# Patient Record
Sex: MALE | Race: WHITE | URBAN - METROPOLITAN AREA
[De-identification: names, ages, dates, MRNs, and addresses within clinical notes are randomized per-mention and may not be internally consistent; named-entity substitution may affect disease eponyms.]

---

## 2020-06-25 ENCOUNTER — OFFICE VISIT (OUTPATIENT)
Dept: URBAN - METROPOLITAN AREA CLINIC 113 | Facility: CLINIC | Age: 65
End: 2020-06-25

## 2020-07-10 ENCOUNTER — OFFICE VISIT (OUTPATIENT)
Dept: URBAN - METROPOLITAN AREA SURGERY CENTER 25 | Facility: SURGERY CENTER | Age: 65
End: 2020-07-10

## 2020-07-25 ENCOUNTER — TELEPHONE ENCOUNTER (OUTPATIENT)
Dept: URBAN - METROPOLITAN AREA CLINIC 13 | Facility: CLINIC | Age: 65
End: 2020-07-25

## 2020-07-25 RX ORDER — ASPIRIN 81 MG/1
TAKE 1 TABLET DAILY TABLET, DELAYED RELEASE ORAL
Refills: 0 | OUTPATIENT
End: 2018-10-10

## 2020-07-25 RX ORDER — SUCRALFATE 1 G/1
DISSOLVE 1 TABLET IN 10 ML OF WATER TO CREATE SLURRY, THEN DRINK THIS SLURRY FOUR TIMES DAILY TABLET ORAL
Qty: 120 | Refills: 5 | OUTPATIENT
Start: 2019-06-14 | End: 2020-02-20

## 2020-07-25 RX ORDER — SODIUM SULFATE, POTASSIUM SULFATE, MAGNESIUM SULFATE 17.5; 3.13; 1.6 G/ML; G/ML; G/ML
DILUTE CONTENTS AND USE AS DIRECTED FOR BOWEL PREP SOLUTION, CONCENTRATE ORAL
Qty: 1 | Refills: 0 | OUTPATIENT
Start: 2019-06-14 | End: 2019-09-30

## 2020-07-25 RX ORDER — AMITRIPTYLINE HYDROCHLORIDE 10 MG/1
TAKE 1 TAB PO QHS FOR 2 WEEKS, THEN INCREASE TO 2 TABS PO QHS TABLET, FILM COATED ORAL
Qty: 45 | Refills: 1 | OUTPATIENT
Start: 2019-09-30 | End: 2020-02-20

## 2020-07-25 RX ORDER — PANTOPRAZOLE SODIUM 40 MG/1
TAKE 1 TABLET DAILY TABLET, DELAYED RELEASE ORAL
Qty: 90 | Refills: 3 | OUTPATIENT
Start: 2019-06-03 | End: 2019-06-03

## 2020-07-25 RX ORDER — NITROGLYCERIN 0.4 MG/1
PLACE ONE TABLET UNDER THE TONGUE AT ONSET OF CHEST PAIN. MAY REPEAT E TABLET SUBLINGUAL
Qty: 25 | Refills: 0 | OUTPATIENT
Start: 2019-04-25 | End: 2020-04-03

## 2020-07-25 RX ORDER — PHENOBARBITAL, HYOSCYAMINE SULFATE, ATROPINE SULFATE, SCOPOLAMINE HYDROBROMIDE 16.2; .1037; .0194; .0065 MG/5ML; MG/5ML; MG/5ML; MG/5ML
ELIXIR ORAL
Refills: 0 | OUTPATIENT
End: 2020-04-03

## 2020-07-25 RX ORDER — CARVEDILOL 3.12 MG/1
TAKE 1 TABLET TWICE DAILY WITH MEALS TABLET, FILM COATED ORAL
Refills: 0 | OUTPATIENT
End: 2020-02-20

## 2020-07-25 RX ORDER — PRAVASTATIN SODIUM 10 MG/1
TAKE 1 TABLET DAILY AS DIRECTED TABLET ORAL
Refills: 0 | OUTPATIENT
End: 2019-06-03

## 2020-07-25 RX ORDER — BUSPIRONE HYDROCHLORIDE 5 MG/1
TAKE 1 TABLET BY MOUTH TWICE DAILY TABLET ORAL
Qty: 60 | Refills: 2 | OUTPATIENT
Start: 2020-02-20 | End: 2020-04-03

## 2020-07-25 RX ORDER — POLYETHYLENE GLYCOL 3350, SODIUM SULFATE, SODIUM CHLORIDE, POTASSIUM CHLORIDE, ASCORBIC ACID, SODIUM ASCORBATE 7.5-2.691G
USE AS DIRECTED KIT ORAL
Qty: 1 | Refills: 0 | OUTPATIENT
Start: 2014-03-21 | End: 2014-04-09

## 2020-07-26 ENCOUNTER — TELEPHONE ENCOUNTER (OUTPATIENT)
Dept: URBAN - METROPOLITAN AREA CLINIC 13 | Facility: CLINIC | Age: 65
End: 2020-07-26

## 2020-07-26 RX ORDER — AMOXICILLIN 875 MG/1
TAKE ONE TABLET BY MOUTH TWICE A DAY TABLET, FILM COATED ORAL
Qty: 20 | Refills: 0 | Status: ACTIVE | COMMUNITY
Start: 2018-04-06

## 2020-07-26 RX ORDER — NEOMYCIN AND POLYMYXIN B SULFATES AND DEXAMETHASONE 1; 3.5; 1 MG/G; MG/G; [IU]/G
APPLY A SMALL AMOUNT INTO THE CONJUNCTIVAL SAC(S) IN AFFECTED EYE(S) T OINTMENT OPHTHALMIC
Qty: 4 | Refills: 0 | Status: ACTIVE | COMMUNITY
Start: 2018-07-25

## 2020-07-26 RX ORDER — AMOXICILLIN 500 MG/1
CAPSULE ORAL
Qty: 30 | Refills: 0 | Status: ACTIVE | COMMUNITY
Start: 2018-10-30

## 2020-07-26 RX ORDER — IBUPROFEN 400 MG/1
TABLET, FILM COATED ORAL
Qty: 15 | Refills: 0 | Status: ACTIVE | COMMUNITY
Start: 2019-01-05

## 2020-07-26 RX ORDER — TICAGRELOR 90 MG/1
TABLET ORAL
Qty: 60 | Refills: 0 | Status: ACTIVE | COMMUNITY
Start: 2018-12-08

## 2020-07-26 RX ORDER — PRAVASTATIN SODIUM 40 MG/1
TAKE ONE TABLET BY MOUTH NIGHTLY TABLET ORAL
Qty: 30 | Refills: 0 | Status: ACTIVE | COMMUNITY
Start: 2018-03-13

## 2020-07-26 RX ORDER — TOBRAMYCIN AND DEXAMETHASONE 3; 1 MG/ML; MG/ML
INSTILL ONE DROP INTO THE RIGHT EYE THREE TIMES DAILY FOR 7 DAYS, THEN SUSPENSION/ DROPS OPHTHALMIC
Qty: 10 | Refills: 0 | Status: ACTIVE | COMMUNITY
Start: 2018-07-23

## 2020-07-26 RX ORDER — PANTOPRAZOLE SODIUM 40 MG/1
TAKE ONE TABLET TWICE A DAY TABLET, DELAYED RELEASE ORAL
Qty: 60 | Refills: 5 | Status: ACTIVE | COMMUNITY
Start: 2020-06-02

## 2020-07-26 RX ORDER — ATORVASTATIN CALCIUM 40 MG/1
TABLET, FILM COATED ORAL
Qty: 90 | Refills: 0 | Status: ACTIVE | COMMUNITY
Start: 2014-01-19

## 2020-07-26 RX ORDER — CLINDAMYCIN HYDROCHLORIDE 150 MG/1
CAPSULE ORAL
Qty: 56 | Refills: 0 | Status: ACTIVE | COMMUNITY
Start: 2018-06-08

## 2020-07-26 RX ORDER — EVOLOCUMAB 140 MG/ML
INJECTION, SOLUTION SUBCUTANEOUS
Qty: 2 | Refills: 0 | Status: ACTIVE | COMMUNITY
Start: 2018-12-10

## 2020-07-26 RX ORDER — ALIROCUMAB 75 MG/ML
INJECT 1 ML EVERY 2 WEEKS INJECTION, SOLUTION SUBCUTANEOUS
Refills: 0 | Status: ACTIVE | COMMUNITY
Start: 2020-01-30

## 2020-07-26 RX ORDER — DIAZEPAM 10 MG/1
TABLET ORAL
Qty: 1 | Refills: 0 | Status: ACTIVE | COMMUNITY
Start: 2019-02-28

## 2020-07-26 RX ORDER — FENOFIBRATE 48 MG/1
TABLET ORAL
Qty: 90 | Refills: 0 | Status: ACTIVE | COMMUNITY
Start: 2018-06-28

## 2020-07-26 RX ORDER — POLYETHYLENE GLYCOL 3350, SODIUM CHLORIDE, SODIUM BICARBONATE AND POTASSIUM CHLORIDE WITH LEMON FLAVOR 420; 11.2; 5.72; 1.48 G/4L; G/4L; G/4L; G/4L
TAKE 1/2 GALLON AT 5:00 PM DAY BEFORE PROCEDURE, TAKE SECOND 1/2 OF GALLON 6 HRS PRIOR TO PROCEDURE POWDER, FOR SOLUTION ORAL
Qty: 1 | Refills: 0 | Status: ACTIVE | COMMUNITY
Start: 2020-07-24

## 2020-07-26 RX ORDER — AZITHROMYCIN DIHYDRATE 250 MG/1
TABLET, FILM COATED ORAL
Qty: 6 | Refills: 0 | Status: ACTIVE | COMMUNITY
Start: 2014-02-17

## 2020-07-26 RX ORDER — FLUOROURACIL 50 MG/G
APPLY TO AFFECTED AREA(S) OF THE NOSE EVERY NIGHT AFTER WASHING FOR TW CREAM TOPICAL
Qty: 40 | Refills: 0 | Status: ACTIVE | COMMUNITY
Start: 2019-10-01

## 2020-07-26 RX ORDER — PRAVASTATIN SODIUM 20 MG/1
TAKE 1 TABLET DAILY AS DIRECTED TABLET ORAL
Refills: 0 | Status: ACTIVE | COMMUNITY

## 2020-07-26 RX ORDER — CIPROFLOXACIN 3 MG/ML
SOLUTION OPHTHALMIC
Qty: 5 | Refills: 0 | Status: ACTIVE | COMMUNITY
Start: 2018-07-21

## 2020-07-26 RX ORDER — AZELASTINE HYDROCHLORIDE 137 UG/1
SPRAY, METERED NASAL
Qty: 30 | Refills: 0 | Status: ACTIVE | COMMUNITY
Start: 2018-04-04

## 2020-07-26 RX ORDER — BENZONATATE 100 MG/1
CAPSULE ORAL
Qty: 30 | Refills: 0 | Status: ACTIVE | COMMUNITY
Start: 2014-02-17

## 2020-07-26 RX ORDER — ETODOLAC 400 MG/1
TAKE ONE TABLET BY MOUTH THREE TIMES A DAY AS NEEDED FOR PAIN TABLET, COATED ORAL
Qty: 12 | Refills: 0 | Status: ACTIVE | COMMUNITY
Start: 2018-10-30

## 2020-07-26 RX ORDER — TICAGRELOR 60 MG/1
TAKE 1 TABLET BY MOUTH TWICE A DAY. DOSE UNKNOWN PER PT TABLET ORAL
Refills: 0 | Status: ACTIVE | COMMUNITY

## 2020-07-26 RX ORDER — FAMOTIDINE 40 MG/1
TAKE 1 TABLET BEDTIME TABLET ORAL
Qty: 30 | Refills: 3 | Status: ACTIVE | COMMUNITY
Start: 2020-07-29

## 2020-07-26 RX ORDER — SILDENAFIL CITRATE 100 MG/1
TAKE ONE TABLET BY MOUTH ONCE A DAY AS NEEDED, APPROXIMATELY 1 HOUR BE TABLET, FILM COATED ORAL
Qty: 6 | Refills: 0 | Status: ACTIVE | COMMUNITY
Start: 2017-12-11

## 2020-07-26 RX ORDER — METHYLPREDNISOLONE 4 MG/1
TABLET ORAL
Qty: 21 | Refills: 0 | Status: ACTIVE | COMMUNITY
Start: 2018-04-04

## 2020-07-26 RX ORDER — VALACYCLOVIR 1 G/1
TABLET, FILM COATED ORAL
Qty: 21 | Refills: 0 | Status: ACTIVE | COMMUNITY
Start: 2018-07-25

## 2020-07-26 RX ORDER — NITROGLYCERIN 0.4 MG/1
TABLET SUBLINGUAL
Qty: 25 | Refills: 0 | Status: ACTIVE | COMMUNITY
Start: 2018-12-08

## 2020-07-26 RX ORDER — FLUOROURACIL 50 MG/G
CREAM TOPICAL
Qty: 40 | Refills: 0 | Status: ACTIVE | COMMUNITY
Start: 2018-09-20

## 2020-07-29 ENCOUNTER — OFFICE VISIT (OUTPATIENT)
Dept: URBAN - METROPOLITAN AREA SURGERY CENTER 25 | Facility: SURGERY CENTER | Age: 65
End: 2020-07-29

## 2020-07-29 ENCOUNTER — CLAIMS CREATED FROM THE CLAIM WINDOW (OUTPATIENT)
Dept: URBAN - METROPOLITAN AREA CLINIC 4 | Facility: CLINIC | Age: 65
End: 2020-07-29
Payer: MEDICARE

## 2020-07-29 DIAGNOSIS — K21.0 GASTRO-ESOPHAGEAL REFLUX DISEASE WITH ESOPHAGITIS: ICD-10-CM

## 2020-07-29 DIAGNOSIS — K29.60 OTHER GASTRITIS WITHOUT BLEEDING: ICD-10-CM

## 2020-07-29 PROCEDURE — 88312 SPECIAL STAINS GROUP 1: CPT | Performed by: PATHOLOGY

## 2020-07-29 PROCEDURE — 88313 SPECIAL STAINS GROUP 2: CPT | Performed by: PATHOLOGY

## 2020-07-29 PROCEDURE — 88305 TISSUE EXAM BY PATHOLOGIST: CPT | Performed by: PATHOLOGY

## 2020-08-12 ENCOUNTER — OFFICE VISIT (OUTPATIENT)
Dept: URBAN - METROPOLITAN AREA CLINIC 113 | Facility: CLINIC | Age: 65
End: 2020-08-12

## 2020-08-24 ENCOUNTER — OFFICE VISIT (OUTPATIENT)
Dept: URBAN - METROPOLITAN AREA CLINIC 113 | Facility: CLINIC | Age: 65
End: 2020-08-24

## 2020-08-25 ENCOUNTER — OFFICE VISIT (OUTPATIENT)
Dept: URBAN - METROPOLITAN AREA SURGERY CENTER 25 | Facility: SURGERY CENTER | Age: 65
End: 2020-08-25
Payer: MEDICARE

## 2020-08-25 ENCOUNTER — CLAIMS CREATED FROM THE CLAIM WINDOW (OUTPATIENT)
Dept: URBAN - METROPOLITAN AREA CLINIC 4 | Facility: CLINIC | Age: 65
End: 2020-08-25
Payer: MEDICARE

## 2020-08-25 DIAGNOSIS — D12.3 ADENOMA OF TRANSVERSE COLON: ICD-10-CM

## 2020-08-25 DIAGNOSIS — Z86.010 HISTORY OF COLONIC POLYPS: ICD-10-CM

## 2020-08-25 DIAGNOSIS — D12.3 BENIGN NEOPLASM OF TRANSVERSE COLON: ICD-10-CM

## 2020-08-25 DIAGNOSIS — K57.30 COLON, DIVERTICULOSIS: ICD-10-CM

## 2020-08-25 DIAGNOSIS — K64.0 FIRST DEGREE HEMORRHOIDS: ICD-10-CM

## 2020-08-25 PROCEDURE — 45385 COLONOSCOPY W/LESION REMOVAL: CPT | Performed by: INTERNAL MEDICINE

## 2020-08-25 PROCEDURE — 88305 TISSUE EXAM BY PATHOLOGIST: CPT | Performed by: PATHOLOGY

## 2020-08-25 RX ORDER — METHYLPREDNISOLONE 4 MG/1
TABLET ORAL
Qty: 21 | Refills: 0 | Status: ACTIVE | COMMUNITY
Start: 2018-04-04

## 2020-08-25 RX ORDER — ATORVASTATIN CALCIUM 40 MG/1
TABLET, FILM COATED ORAL
Qty: 90 | Refills: 0 | Status: ACTIVE | COMMUNITY
Start: 2014-01-19

## 2020-08-25 RX ORDER — NEOMYCIN AND POLYMYXIN B SULFATES AND DEXAMETHASONE 1; 3.5; 1 MG/G; MG/G; [IU]/G
APPLY A SMALL AMOUNT INTO THE CONJUNCTIVAL SAC(S) IN AFFECTED EYE(S) T OINTMENT OPHTHALMIC
Qty: 4 | Refills: 0 | Status: ACTIVE | COMMUNITY
Start: 2018-07-25

## 2020-08-25 RX ORDER — FENOFIBRATE 48 MG/1
TABLET ORAL
Qty: 90 | Refills: 0 | Status: ACTIVE | COMMUNITY
Start: 2018-06-28

## 2020-08-25 RX ORDER — NITROGLYCERIN 0.4 MG/1
TABLET SUBLINGUAL
Qty: 25 | Refills: 0 | Status: ACTIVE | COMMUNITY
Start: 2018-12-08

## 2020-08-25 RX ORDER — AMOXICILLIN 500 MG/1
CAPSULE ORAL
Qty: 30 | Refills: 0 | Status: ACTIVE | COMMUNITY
Start: 2018-10-30

## 2020-08-25 RX ORDER — TOBRAMYCIN AND DEXAMETHASONE 3; 1 MG/ML; MG/ML
INSTILL ONE DROP INTO THE RIGHT EYE THREE TIMES DAILY FOR 7 DAYS, THEN SUSPENSION/ DROPS OPHTHALMIC
Qty: 10 | Refills: 0 | Status: ACTIVE | COMMUNITY
Start: 2018-07-23

## 2020-08-25 RX ORDER — AZELASTINE HYDROCHLORIDE 137 UG/1
SPRAY, METERED NASAL
Qty: 30 | Refills: 0 | Status: ACTIVE | COMMUNITY
Start: 2018-04-04

## 2020-08-25 RX ORDER — FLUOROURACIL 50 MG/G
CREAM TOPICAL
Qty: 40 | Refills: 0 | Status: ACTIVE | COMMUNITY
Start: 2018-09-20

## 2020-08-25 RX ORDER — TICAGRELOR 60 MG/1
TAKE 1 TABLET BY MOUTH TWICE A DAY. DOSE UNKNOWN PER PT TABLET ORAL
Refills: 0 | Status: ACTIVE | COMMUNITY

## 2020-08-25 RX ORDER — VALACYCLOVIR 1 G/1
TABLET, FILM COATED ORAL
Qty: 21 | Refills: 0 | Status: ACTIVE | COMMUNITY
Start: 2018-07-25

## 2020-08-25 RX ORDER — EVOLOCUMAB 140 MG/ML
INJECTION, SOLUTION SUBCUTANEOUS
Qty: 2 | Refills: 0 | Status: ACTIVE | COMMUNITY
Start: 2018-12-10

## 2020-08-25 RX ORDER — CIPROFLOXACIN 3 MG/ML
SOLUTION OPHTHALMIC
Qty: 5 | Refills: 0 | Status: ACTIVE | COMMUNITY
Start: 2018-07-21

## 2020-08-25 RX ORDER — BENZONATATE 100 MG/1
CAPSULE ORAL
Qty: 30 | Refills: 0 | Status: ACTIVE | COMMUNITY
Start: 2014-02-17

## 2020-08-25 RX ORDER — PRAVASTATIN SODIUM 40 MG/1
TAKE ONE TABLET BY MOUTH NIGHTLY TABLET ORAL
Qty: 30 | Refills: 0 | Status: ACTIVE | COMMUNITY
Start: 2018-03-13

## 2020-08-25 RX ORDER — DIAZEPAM 10 MG/1
TABLET ORAL
Qty: 1 | Refills: 0 | Status: ACTIVE | COMMUNITY
Start: 2019-02-28

## 2020-08-25 RX ORDER — IBUPROFEN 400 MG/1
TABLET, FILM COATED ORAL
Qty: 15 | Refills: 0 | Status: ACTIVE | COMMUNITY
Start: 2019-01-05

## 2020-08-25 RX ORDER — ETODOLAC 400 MG/1
TAKE ONE TABLET BY MOUTH THREE TIMES A DAY AS NEEDED FOR PAIN TABLET, COATED ORAL
Qty: 12 | Refills: 0 | Status: ACTIVE | COMMUNITY
Start: 2018-10-30

## 2020-08-25 RX ORDER — SILDENAFIL CITRATE 100 MG/1
TAKE ONE TABLET BY MOUTH ONCE A DAY AS NEEDED, APPROXIMATELY 1 HOUR BE TABLET, FILM COATED ORAL
Qty: 6 | Refills: 0 | Status: ACTIVE | COMMUNITY
Start: 2017-12-11

## 2020-08-25 RX ORDER — AZITHROMYCIN DIHYDRATE 250 MG/1
TABLET, FILM COATED ORAL
Qty: 6 | Refills: 0 | Status: ACTIVE | COMMUNITY
Start: 2014-02-17

## 2020-08-25 RX ORDER — FAMOTIDINE 40 MG/1
TAKE 1 TABLET BEDTIME TABLET ORAL
Qty: 30 | Refills: 3 | Status: ACTIVE | COMMUNITY
Start: 2020-07-29

## 2020-08-25 RX ORDER — POLYETHYLENE GLYCOL 3350, SODIUM CHLORIDE, SODIUM BICARBONATE AND POTASSIUM CHLORIDE WITH LEMON FLAVOR 420; 11.2; 5.72; 1.48 G/4L; G/4L; G/4L; G/4L
TAKE 1/2 GALLON AT 5:00 PM DAY BEFORE PROCEDURE, TAKE SECOND 1/2 OF GALLON 6 HRS PRIOR TO PROCEDURE POWDER, FOR SOLUTION ORAL
Qty: 1 | Refills: 0 | Status: ACTIVE | COMMUNITY
Start: 2020-07-24

## 2020-08-25 RX ORDER — PRAVASTATIN SODIUM 20 MG/1
TAKE 1 TABLET DAILY AS DIRECTED TABLET ORAL
Refills: 0 | Status: ACTIVE | COMMUNITY

## 2020-08-25 RX ORDER — CLINDAMYCIN HYDROCHLORIDE 150 MG/1
CAPSULE ORAL
Qty: 56 | Refills: 0 | Status: ACTIVE | COMMUNITY
Start: 2018-06-08

## 2020-08-25 RX ORDER — PANTOPRAZOLE SODIUM 40 MG/1
TAKE ONE TABLET TWICE A DAY TABLET, DELAYED RELEASE ORAL
Qty: 60 | Refills: 5 | Status: ACTIVE | COMMUNITY
Start: 2020-06-02

## 2020-08-25 RX ORDER — AMOXICILLIN 875 MG/1
TAKE ONE TABLET BY MOUTH TWICE A DAY TABLET, FILM COATED ORAL
Qty: 20 | Refills: 0 | Status: ACTIVE | COMMUNITY
Start: 2018-04-06

## 2020-08-25 RX ORDER — TICAGRELOR 90 MG/1
TABLET ORAL
Qty: 60 | Refills: 0 | Status: ACTIVE | COMMUNITY
Start: 2018-12-08

## 2020-08-25 RX ORDER — ALIROCUMAB 75 MG/ML
INJECT 1 ML EVERY 2 WEEKS INJECTION, SOLUTION SUBCUTANEOUS
Refills: 0 | Status: ACTIVE | COMMUNITY
Start: 2020-01-30

## 2020-09-08 ENCOUNTER — OFFICE VISIT (OUTPATIENT)
Dept: URBAN - METROPOLITAN AREA CLINIC 113 | Facility: CLINIC | Age: 65
End: 2020-09-08

## 2020-09-08 RX ORDER — METHYLPREDNISOLONE 4 MG/1
TABLET ORAL
Qty: 21 | Refills: 0 | Status: ACTIVE | COMMUNITY
Start: 2018-04-04

## 2020-09-08 RX ORDER — PRAVASTATIN SODIUM 20 MG/1
TAKE 1 TABLET DAILY AS DIRECTED TABLET ORAL
Refills: 0 | Status: ACTIVE | COMMUNITY

## 2020-09-08 RX ORDER — TOBRAMYCIN AND DEXAMETHASONE 3; 1 MG/ML; MG/ML
INSTILL ONE DROP INTO THE RIGHT EYE THREE TIMES DAILY FOR 7 DAYS, THEN SUSPENSION/ DROPS OPHTHALMIC
Qty: 10 | Refills: 0 | Status: ACTIVE | COMMUNITY
Start: 2018-07-23

## 2020-09-08 RX ORDER — AMOXICILLIN 875 MG/1
TAKE ONE TABLET BY MOUTH TWICE A DAY TABLET, FILM COATED ORAL
Qty: 20 | Refills: 0 | Status: ACTIVE | COMMUNITY
Start: 2018-04-06

## 2020-09-08 RX ORDER — FLUOROURACIL 50 MG/G
CREAM TOPICAL
Qty: 40 | Refills: 0 | Status: ACTIVE | COMMUNITY
Start: 2018-09-20

## 2020-09-08 RX ORDER — PRAVASTATIN SODIUM 40 MG/1
TAKE ONE TABLET BY MOUTH NIGHTLY TABLET ORAL
Qty: 30 | Refills: 0 | Status: ACTIVE | COMMUNITY
Start: 2018-03-13

## 2020-09-08 RX ORDER — TICAGRELOR 90 MG/1
TABLET ORAL
Qty: 60 | Refills: 0 | Status: ACTIVE | COMMUNITY
Start: 2018-12-08

## 2020-09-08 RX ORDER — NITROGLYCERIN 0.4 MG/1
TABLET SUBLINGUAL
Qty: 25 | Refills: 0 | Status: ACTIVE | COMMUNITY
Start: 2018-12-08

## 2020-09-08 RX ORDER — ATORVASTATIN CALCIUM 40 MG/1
TABLET, FILM COATED ORAL
Qty: 90 | Refills: 0 | Status: ACTIVE | COMMUNITY
Start: 2014-01-19

## 2020-09-08 RX ORDER — NEOMYCIN AND POLYMYXIN B SULFATES AND DEXAMETHASONE 1; 3.5; 1 MG/G; MG/G; [IU]/G
APPLY A SMALL AMOUNT INTO THE CONJUNCTIVAL SAC(S) IN AFFECTED EYE(S) T OINTMENT OPHTHALMIC
Qty: 4 | Refills: 0 | Status: ACTIVE | COMMUNITY
Start: 2018-07-25

## 2020-09-08 RX ORDER — CIPROFLOXACIN 3 MG/ML
SOLUTION OPHTHALMIC
Qty: 5 | Refills: 0 | Status: ACTIVE | COMMUNITY
Start: 2018-07-21

## 2020-09-08 RX ORDER — SILDENAFIL CITRATE 100 MG/1
TAKE ONE TABLET BY MOUTH ONCE A DAY AS NEEDED, APPROXIMATELY 1 HOUR BE TABLET, FILM COATED ORAL
Qty: 6 | Refills: 0 | Status: ACTIVE | COMMUNITY
Start: 2017-12-11

## 2020-09-08 RX ORDER — FAMOTIDINE 40 MG/1
TAKE 1 TABLET BEDTIME TABLET ORAL
Qty: 30 | Refills: 3 | Status: ACTIVE | COMMUNITY
Start: 2020-07-29

## 2020-09-08 RX ORDER — POLYETHYLENE GLYCOL 3350, SODIUM CHLORIDE, SODIUM BICARBONATE AND POTASSIUM CHLORIDE WITH LEMON FLAVOR 420; 11.2; 5.72; 1.48 G/4L; G/4L; G/4L; G/4L
TAKE 1/2 GALLON AT 5:00 PM DAY BEFORE PROCEDURE, TAKE SECOND 1/2 OF GALLON 6 HRS PRIOR TO PROCEDURE POWDER, FOR SOLUTION ORAL
Qty: 1 | Refills: 0 | Status: ACTIVE | COMMUNITY
Start: 2020-07-24

## 2020-09-08 RX ORDER — AZITHROMYCIN DIHYDRATE 250 MG/1
TABLET, FILM COATED ORAL
Qty: 6 | Refills: 0 | Status: ACTIVE | COMMUNITY
Start: 2014-02-17

## 2020-09-08 RX ORDER — CLINDAMYCIN HYDROCHLORIDE 150 MG/1
CAPSULE ORAL
Qty: 56 | Refills: 0 | Status: ACTIVE | COMMUNITY
Start: 2018-06-08

## 2020-09-08 RX ORDER — AZELASTINE HYDROCHLORIDE 137 UG/1
SPRAY, METERED NASAL
Qty: 30 | Refills: 0 | Status: ACTIVE | COMMUNITY
Start: 2018-04-04

## 2020-09-08 RX ORDER — TICAGRELOR 60 MG/1
TAKE 1 TABLET BY MOUTH TWICE A DAY. DOSE UNKNOWN PER PT TABLET ORAL
Refills: 0 | Status: ACTIVE | COMMUNITY

## 2020-09-08 RX ORDER — VALACYCLOVIR 1 G/1
TABLET, FILM COATED ORAL
Qty: 21 | Refills: 0 | Status: ACTIVE | COMMUNITY
Start: 2018-07-25

## 2020-09-08 RX ORDER — BENZONATATE 100 MG/1
CAPSULE ORAL
Qty: 30 | Refills: 0 | Status: ACTIVE | COMMUNITY
Start: 2014-02-17

## 2020-09-08 RX ORDER — IBUPROFEN 400 MG/1
TABLET, FILM COATED ORAL
Qty: 15 | Refills: 0 | Status: ACTIVE | COMMUNITY
Start: 2019-01-05

## 2020-09-08 RX ORDER — DIAZEPAM 10 MG/1
TABLET ORAL
Qty: 1 | Refills: 0 | Status: ACTIVE | COMMUNITY
Start: 2019-02-28

## 2020-09-08 RX ORDER — AMOXICILLIN 500 MG/1
CAPSULE ORAL
Qty: 30 | Refills: 0 | Status: ACTIVE | COMMUNITY
Start: 2018-10-30

## 2020-09-08 RX ORDER — FENOFIBRATE 48 MG/1
TABLET ORAL
Qty: 90 | Refills: 0 | Status: ACTIVE | COMMUNITY
Start: 2018-06-28

## 2020-09-08 RX ORDER — ETODOLAC 400 MG/1
TAKE ONE TABLET BY MOUTH THREE TIMES A DAY AS NEEDED FOR PAIN TABLET, COATED ORAL
Qty: 12 | Refills: 0 | Status: ACTIVE | COMMUNITY
Start: 2018-10-30

## 2020-09-08 RX ORDER — PANTOPRAZOLE SODIUM 40 MG/1
TAKE ONE TABLET TWICE A DAY TABLET, DELAYED RELEASE ORAL
Qty: 60 | Refills: 5 | Status: ACTIVE | COMMUNITY
Start: 2020-06-02

## 2020-09-08 RX ORDER — ALIROCUMAB 75 MG/ML
INJECT 1 ML EVERY 2 WEEKS INJECTION, SOLUTION SUBCUTANEOUS
Refills: 0 | Status: ACTIVE | COMMUNITY
Start: 2020-01-30

## 2020-09-08 RX ORDER — EVOLOCUMAB 140 MG/ML
INJECTION, SOLUTION SUBCUTANEOUS
Qty: 2 | Refills: 0 | Status: ACTIVE | COMMUNITY
Start: 2018-12-10

## 2020-09-08 NOTE — HPI-TODAY'S VISIT:
65-year-old gentleman with a history of coronary artery disease status post PTCA with stent (12/18) on Brillinta, ELIJAH, GERD with short segment Isaac's esophagus without dysplasia, and adenomatous colon polyps, presenting for follow-up after colonoscopy. He was last seen 4/30/2020 for follow-up regarding GERD with atypical chest pain.  His symptoms had improved with pantoprazole 40 mg daily, along with GI cocktail as needed.  Due to worsening esophageal dysphagia, an EGD with dilation was planned. The EGD was performed on 7/29/2020.  Findings included an irregular Z line and nonerosive gastropathy.  There was no endoscopic esophageal abnormality to explain the patient's dysphagia, status post esophageal dilation with Savary dilator to 17 mm. GEJ biopsies showed reflux type changes, negative for Isaac's.  Gastric biopsy showed mild chemical/reactive gastropathy, negative for H. pylori. He had a surveillance colonoscopy on 8/25/2020 which was notable for four 4 to 5 mm tubular adenomas in the transverse colon and at the hepatic flexure, diverticulosis in the ascending colon, and nonbleeding internal hemorrhoids.  Recommended repeat colonoscopy in 3 years for surveillance purposes.

## 2020-09-08 NOTE — HPI-OTHER HISTORIES
CT abdomen and pelvis with contrast (6/11/19) : no acute findings, 9 mm left renal calculus. EGD (11/28/18) : irregular Z-line with biopsies showing focal intestinal metaplasia but not dysplasia, a small sliding hiatal hernia, nonerosive gastropathy in the antrum with biopsies negative for significant pathology, and a normal duodenal bulb and second portion of the duodenum. Abdominal ultrasound (8/30/18) : 1.3cm nonobstructing shadowing stone within left kidney, increased hepatic echogenicity, statistically hepatic steatosis.

## 2022-05-03 ENCOUNTER — LAB OUTSIDE AN ENCOUNTER (OUTPATIENT)
Dept: URBAN - METROPOLITAN AREA CLINIC 113 | Facility: CLINIC | Age: 67
End: 2022-05-03

## 2022-05-03 ENCOUNTER — OFFICE VISIT (OUTPATIENT)
Dept: URBAN - METROPOLITAN AREA CLINIC 113 | Facility: CLINIC | Age: 67
End: 2022-05-03
Payer: MEDICARE

## 2022-05-03 ENCOUNTER — WEB ENCOUNTER (OUTPATIENT)
Dept: URBAN - METROPOLITAN AREA CLINIC 113 | Facility: CLINIC | Age: 67
End: 2022-05-03

## 2022-05-03 VITALS
HEIGHT: 73 IN | TEMPERATURE: 98.4 F | DIASTOLIC BLOOD PRESSURE: 85 MMHG | SYSTOLIC BLOOD PRESSURE: 134 MMHG | HEART RATE: 80 BPM | BODY MASS INDEX: 33 KG/M2 | WEIGHT: 249 LBS

## 2022-05-03 DIAGNOSIS — R13.19 INTERMITTENT DYSPHAGIA: ICD-10-CM

## 2022-05-03 DIAGNOSIS — R07.89 ATYPICAL CHEST PAIN: ICD-10-CM

## 2022-05-03 DIAGNOSIS — R10.13 EPIGASTRIC ABDOMINAL PAIN: ICD-10-CM

## 2022-05-03 DIAGNOSIS — Z79.01 CHRONIC ANTICOAGULATION: ICD-10-CM

## 2022-05-03 DIAGNOSIS — Z87.19 HISTORY OF BARRETT'S ESOPHAGUS: ICD-10-CM

## 2022-05-03 DIAGNOSIS — K21.9 GASTROESOPHAGEAL REFLUX DISEASE, UNSPECIFIED WHETHER ESOPHAGITIS PRESENT: ICD-10-CM

## 2022-05-03 PROCEDURE — 99214 OFFICE O/P EST MOD 30 MIN: CPT | Performed by: PHYSICIAN ASSISTANT

## 2022-05-03 RX ORDER — PRAVASTATIN SODIUM 20 MG/1
TAKE 1 TABLET DAILY AS DIRECTED TABLET ORAL
Refills: 0 | Status: ACTIVE | COMMUNITY

## 2022-05-03 RX ORDER — TOBRAMYCIN AND DEXAMETHASONE 3; 1 MG/ML; MG/ML
INSTILL ONE DROP INTO THE RIGHT EYE THREE TIMES DAILY FOR 7 DAYS, THEN SUSPENSION/ DROPS OPHTHALMIC
Qty: 10 | Refills: 0 | Status: ON HOLD | COMMUNITY
Start: 2018-07-23

## 2022-05-03 RX ORDER — TICAGRELOR 90 MG/1
TABLET ORAL
Qty: 60 | Refills: 0 | Status: ACTIVE | COMMUNITY
Start: 2018-12-08

## 2022-05-03 RX ORDER — ALIROCUMAB 75 MG/ML
INJECT 1 ML EVERY 2 WEEKS INJECTION, SOLUTION SUBCUTANEOUS
Refills: 0 | Status: ON HOLD | COMMUNITY
Start: 2020-01-30

## 2022-05-03 RX ORDER — PANTOPRAZOLE SODIUM 40 MG/1
TAKE ONE TABLET TWICE A DAY TABLET, DELAYED RELEASE ORAL
Qty: 60 | Refills: 5 | Status: ACTIVE | COMMUNITY
Start: 2020-06-02

## 2022-05-03 RX ORDER — NITROGLYCERIN 0.2 MG/H
1 PATCH TO SKIN REMOVE AFTER 12 HOURS PATCH TRANSDERMAL ONCE A DAY
Status: ACTIVE | COMMUNITY

## 2022-05-03 NOTE — HPI-TODAY'S VISIT:
Mr. Tovar is a 67-year-old gentleman with a history of coronary artery disease status post PTCA with stent placement (12/18) on Brilinta, obstructive sleep apnea, GERD short segment Isaac's esophagus without dysplasia, presenting for evaluation regarding abdominal pain. He was last seen in this office on fourth 3/20/2021 for dysphagia.  His chest pain was noted to have improved with pantoprazole 40 mg once daily and GI cocktail as dilation was recommended given his persistent complaints of EGD with dilation (7/29/2020): Z-line irregular otherwise normal esophagus.  No endoscopic esophageal abnormality to explain patient's dysphagia.  Nausea gastropathy.  Normal duodenum.  Stomach antrum and body biopsies revealed mild chemical/reactive gastropathy.  No evidence for dysplasia or malignancy or infectious organisms.  GE junction biopsies revealed gastric type mucosa with reflux type changes; no squamous mucosa identified.  No evidence of Isaac's esophagus or eosinophilic esophagitis.  Negative for infectious organisms, dysplasia or malignancy. Interval history he had a colonoscopy performed for surveillance purposes in 2020 as well. Colonoscopy (8/25/2020): [place] bowel preparation scale score of 9.  4 (4 to 5 mm) polyps in the transverse colon and at the hepatic flexure.  Diverticulosis in the ascending colon.  Nonbleeding internal hemorrhoids, grade 1.  Pathology revealed these to be tubular adenomas.  He reports a long-term history of chest discomfort and indigestion.  About 7 to 8 weeks ago he noticed persistent symptoms.  He was seen by his cardiologist and underwent cardiac stress testing and was placed on a Nitropatch.  He was reportedly instructed to follow-up with GI as his chest pain was thought to be secondary to acid reflux.  He states that his symptoms have minimally improved with taking a GI cocktail as needed, pantoprazole 40 mg twice daily, and dietary/lifestyle modifications.  Unfortunately, he continues to feel a burning sensation in his midsternal chest.  He also describes a sensation of foods and pills becoming hung at the level of his sternal notch.  He denies any regurgitation or odynophagia.  No nocturnal symptoms.  He has associated nonradiating epigastric abdominal pain.  He denies any nausea, vomiting, decreased appetite or unintentional weight loss.  He does not take NSAIDs. He states that his bowel habits are "normal and good".  No red blood per rectum, melena or hematochezia.

## 2022-05-17 PROBLEM — 711150003: Status: ACTIVE | Noted: 2022-05-17

## 2022-05-19 ENCOUNTER — TELEPHONE ENCOUNTER (OUTPATIENT)
Dept: URBAN - METROPOLITAN AREA CLINIC 113 | Facility: CLINIC | Age: 67
End: 2022-05-19

## 2022-06-07 ENCOUNTER — TELEPHONE ENCOUNTER (OUTPATIENT)
Dept: URBAN - METROPOLITAN AREA CLINIC 111 | Facility: CLINIC | Age: 67
End: 2022-06-07

## 2022-06-08 ENCOUNTER — OFFICE VISIT (OUTPATIENT)
Dept: URBAN - METROPOLITAN AREA SURGERY CENTER 25 | Facility: SURGERY CENTER | Age: 67
End: 2022-06-08
Payer: MEDICARE

## 2022-06-08 ENCOUNTER — CLAIMS CREATED FROM THE CLAIM WINDOW (OUTPATIENT)
Dept: URBAN - METROPOLITAN AREA CLINIC 4 | Facility: CLINIC | Age: 67
End: 2022-06-08
Payer: MEDICARE

## 2022-06-08 ENCOUNTER — WEB ENCOUNTER (OUTPATIENT)
Dept: URBAN - METROPOLITAN AREA SURGERY CENTER 25 | Facility: SURGERY CENTER | Age: 67
End: 2022-06-08

## 2022-06-08 DIAGNOSIS — R10.13 ABDOMINAL PAIN, EPIGASTRIC: ICD-10-CM

## 2022-06-08 DIAGNOSIS — K31.89 REACTIVE GASTROPATHY: ICD-10-CM

## 2022-06-08 DIAGNOSIS — R13.10 DYSPHAGIA: ICD-10-CM

## 2022-06-08 DIAGNOSIS — K22.89 OTHER SPECIFIED DISEASE OF ESOPHAGUS: ICD-10-CM

## 2022-06-08 DIAGNOSIS — K29.70 GASTRITIS, UNSPECIFIED, WITHOUT BLEEDING: ICD-10-CM

## 2022-06-08 PROCEDURE — 88312 SPECIAL STAINS GROUP 1: CPT | Performed by: PATHOLOGY

## 2022-06-08 PROCEDURE — G8907 PT DOC NO EVENTS ON DISCHARG: HCPCS | Performed by: INTERNAL MEDICINE

## 2022-06-08 PROCEDURE — 43239 EGD BIOPSY SINGLE/MULTIPLE: CPT | Performed by: INTERNAL MEDICINE

## 2022-06-08 PROCEDURE — 43248 EGD GUIDE WIRE INSERTION: CPT | Performed by: INTERNAL MEDICINE

## 2022-06-08 PROCEDURE — 88305 TISSUE EXAM BY PATHOLOGIST: CPT | Performed by: PATHOLOGY

## 2022-06-08 RX ORDER — TICAGRELOR 90 MG/1
TABLET ORAL
Qty: 60 | Refills: 0 | Status: ACTIVE | COMMUNITY
Start: 2018-12-08

## 2022-06-08 RX ORDER — ALIROCUMAB 75 MG/ML
INJECT 1 ML EVERY 2 WEEKS INJECTION, SOLUTION SUBCUTANEOUS
Refills: 0 | Status: ON HOLD | COMMUNITY
Start: 2020-01-30

## 2022-06-08 RX ORDER — PRAVASTATIN SODIUM 20 MG/1
TAKE 1 TABLET DAILY AS DIRECTED TABLET ORAL
Refills: 0 | Status: ACTIVE | COMMUNITY

## 2022-06-08 RX ORDER — PANTOPRAZOLE SODIUM 40 MG/1
TAKE ONE TABLET TWICE A DAY TABLET, DELAYED RELEASE ORAL
Qty: 60 | Refills: 5 | Status: ACTIVE | COMMUNITY
Start: 2020-06-02

## 2022-06-08 RX ORDER — NITROGLYCERIN 0.2 MG/H
1 PATCH TO SKIN REMOVE AFTER 12 HOURS PATCH TRANSDERMAL ONCE A DAY
Status: ACTIVE | COMMUNITY

## 2022-06-08 RX ORDER — TOBRAMYCIN AND DEXAMETHASONE 3; 1 MG/ML; MG/ML
INSTILL ONE DROP INTO THE RIGHT EYE THREE TIMES DAILY FOR 7 DAYS, THEN SUSPENSION/ DROPS OPHTHALMIC
Qty: 10 | Refills: 0 | Status: ON HOLD | COMMUNITY
Start: 2018-07-23

## 2022-06-17 PROBLEM — 40739000 DYSPHAGIA: Status: ACTIVE | Noted: 2022-06-17

## 2022-08-03 ENCOUNTER — OFFICE VISIT (OUTPATIENT)
Dept: URBAN - METROPOLITAN AREA CLINIC 113 | Facility: CLINIC | Age: 67
End: 2022-08-03
Payer: MEDICARE

## 2022-08-03 ENCOUNTER — WEB ENCOUNTER (OUTPATIENT)
Dept: URBAN - METROPOLITAN AREA CLINIC 113 | Facility: CLINIC | Age: 67
End: 2022-08-03

## 2022-08-03 VITALS
WEIGHT: 251 LBS | BODY MASS INDEX: 33.27 KG/M2 | HEART RATE: 74 BPM | TEMPERATURE: 97.7 F | HEIGHT: 73 IN | DIASTOLIC BLOOD PRESSURE: 79 MMHG | SYSTOLIC BLOOD PRESSURE: 129 MMHG

## 2022-08-03 DIAGNOSIS — K21.9 GERD WITHOUT ESOPHAGITIS: ICD-10-CM

## 2022-08-03 DIAGNOSIS — R07.89 ATYPICAL CHEST PAIN: ICD-10-CM

## 2022-08-03 PROCEDURE — 99213 OFFICE O/P EST LOW 20 MIN: CPT | Performed by: INTERNAL MEDICINE

## 2022-08-03 RX ORDER — TICAGRELOR 90 MG/1
TABLET ORAL
Qty: 60 | Refills: 0 | Status: ACTIVE | COMMUNITY
Start: 2018-12-08

## 2022-08-03 RX ORDER — PRAVASTATIN SODIUM 20 MG/1
TAKE 1 TABLET DAILY AS DIRECTED TABLET ORAL
Refills: 0 | Status: ACTIVE | COMMUNITY

## 2022-08-03 RX ORDER — TOBRAMYCIN AND DEXAMETHASONE 3; 1 MG/ML; MG/ML
INSTILL ONE DROP INTO THE RIGHT EYE THREE TIMES DAILY FOR 7 DAYS, THEN SUSPENSION/ DROPS OPHTHALMIC
Qty: 10 | Refills: 0 | Status: ON HOLD | COMMUNITY
Start: 2018-07-23

## 2022-08-03 RX ORDER — PANTOPRAZOLE SODIUM 40 MG/1
1 TABLET 30 MINUTES BEFORE A MEAL TABLET, DELAYED RELEASE ORAL ONCE A DAY
OUTPATIENT
Start: 2020-06-02

## 2022-08-03 RX ORDER — ALIROCUMAB 75 MG/ML
INJECT 1 ML EVERY 2 WEEKS INJECTION, SOLUTION SUBCUTANEOUS
Refills: 0 | Status: ON HOLD | COMMUNITY
Start: 2020-01-30

## 2022-08-03 RX ORDER — PANTOPRAZOLE SODIUM 40 MG/1
1 TABLET 30 MINUTES BEFORE A MEAL TABLET, DELAYED RELEASE ORAL ONCE A DAY
Refills: 5 | Status: ACTIVE | COMMUNITY
Start: 2020-06-02

## 2022-08-03 RX ORDER — NITROGLYCERIN 0.2 MG/H
1 PATCH TO SKIN REMOVE AFTER 12 HOURS PATCH TRANSDERMAL ONCE A DAY
Status: ACTIVE | COMMUNITY

## 2022-08-03 NOTE — HPI-TODAY'S VISIT:
Mr. Tovar is a 67-year-old gentleman with a history of coronary artery disease status post PTCA with stent placement (12/18) on Brilinta, obstructive sleep apnea, GERD short segment Isaac's esophagus without dysplasia presenting for follow up after EGD for evaluation of atypical chest pain.  He was last seen on 6/8/2022 for an EGD to evaluate dysphagia, unexplained chest pain and upper abdominal pain.  The findings are notable for no endoscopic esophageal abnormality to explain the dysphagia status post empiric dilation of the esophagus with an 18 mm savory dilator, irregular squamocolumnar junction without signs of Isaac's esophagus, gastric erythema status post biopsy showing chemical/reactive gastropathy without H. pylori, and a normal duodenum.  He reports that he is overall doing fairly well for the past 3 weeks.  He had episodic chest pain and reflux symptoms that would generally last anywhere from 2 hours to 2 days.  The symptoms would wax and wane without any obvious modifying factor.  He denies any heartburn or dysphagia.  No nausea, vomiting, melena or red blood per rectum.  He reports that he underwent a root canal several weeks ago for which she was treated with antibiotics.  Since that time his chest pain has resolved.

## 2022-08-03 NOTE — HPI-OTHER HISTORIES
EGD with dilation (7/29/2020): Z-line irregular otherwise normal esophagus.  No endoscopic esophageal abnormality to explain patient's dysphagia.  Nausea gastropathy.  Normal duodenum.  Stomach antrum and body biopsies revealed mild chemical/reactive gastropathy.  No evidence for dysplasia or malignancy or infectious organisms.  GE junction biopsies revealed gastric type mucosa with reflux type changes; no squamous mucosa identified.  No evidence of Isaac's esophagus or eosinophilic esophagitis.  Negative for infectious organisms, dysplasia or malignancy. Colonoscopy (8/25/2020): [place] bowel preparation scale score of 9.  4 (4 to 5 mm) polyps in the transverse colon and at the hepatic flexure.  Diverticulosis in the ascending colon.  Nonbleeding internal hemorrhoids, grade 1.  Pathology revealed these to be tubular adenomas.  CT abdomen and pelvis with contrast (6/11/19) : no acute findings, 9 mm left renal calculus. EGD (11/28/18) : irregular Z-line with biopsies showing focal intestinal metaplasia but not dysplasia, a small sliding hiatal hernia, nonerosive gastropathy in the antrum with biopsies negative for significant pathology, and a normal duodenal bulb and second portion of the duodenum. Abdominal ultrasound (8/30/18) : 1.3cm nonobstructing shadowing stone within left kidney, increased hepatic echogenicity, statistically hepatic steatosis.

## 2022-08-18 PROBLEM — 102589003: Status: ACTIVE | Noted: 2022-08-03

## 2022-08-18 PROBLEM — 266435005: Status: ACTIVE | Noted: 2022-08-03

## 2022-11-16 ENCOUNTER — TELEPHONE ENCOUNTER (OUTPATIENT)
Dept: URBAN - METROPOLITAN AREA CLINIC 113 | Facility: CLINIC | Age: 67
End: 2022-11-16

## 2023-03-14 ENCOUNTER — LAB OUTSIDE AN ENCOUNTER (OUTPATIENT)
Dept: URBAN - METROPOLITAN AREA CLINIC 113 | Facility: CLINIC | Age: 68
End: 2023-03-14

## 2023-03-14 ENCOUNTER — OFFICE VISIT (OUTPATIENT)
Dept: URBAN - METROPOLITAN AREA CLINIC 113 | Facility: CLINIC | Age: 68
End: 2023-03-14
Payer: MEDICARE

## 2023-03-14 VITALS
TEMPERATURE: 97.3 F | HEART RATE: 65 BPM | RESPIRATION RATE: 18 BRPM | HEIGHT: 73 IN | WEIGHT: 248 LBS | DIASTOLIC BLOOD PRESSURE: 76 MMHG | SYSTOLIC BLOOD PRESSURE: 133 MMHG | BODY MASS INDEX: 32.87 KG/M2

## 2023-03-14 DIAGNOSIS — K21.9 GERD WITHOUT ESOPHAGITIS: ICD-10-CM

## 2023-03-14 DIAGNOSIS — R10.13 EPIGASTRIC ABDOMINAL PAIN: ICD-10-CM

## 2023-03-14 DIAGNOSIS — R07.89 ATYPICAL CHEST PAIN: ICD-10-CM

## 2023-03-14 PROBLEM — 79922009: Status: ACTIVE | Noted: 2023-03-14

## 2023-03-14 PROCEDURE — 99214 OFFICE O/P EST MOD 30 MIN: CPT | Performed by: INTERNAL MEDICINE

## 2023-03-14 RX ORDER — PANTOPRAZOLE SODIUM 40 MG/1
1 TABLET 30 MINUTES BEFORE A MEAL TABLET, DELAYED RELEASE ORAL ONCE A DAY
Status: ACTIVE | COMMUNITY
Start: 2020-06-02

## 2023-03-14 RX ORDER — LISINOPRIL 10 MG/1
1 TABLET TABLET ORAL ONCE A DAY
Status: ACTIVE | COMMUNITY

## 2023-03-14 RX ORDER — PANTOPRAZOLE SODIUM 40 MG/1
1 TABLET 30 MINUTES BEFORE A MEAL TABLET, DELAYED RELEASE ORAL ONCE A DAY
OUTPATIENT
Start: 2020-06-02

## 2023-03-14 RX ORDER — CARVEDILOL 3.12 MG/1
1 TABLET WITH FOOD TABLET, FILM COATED ORAL TWICE A DAY
Status: ACTIVE | COMMUNITY

## 2023-03-14 RX ORDER — NITROGLYCERIN 0.2 MG/H
1 PATCH TO SKIN REMOVE AFTER 12 HOURS PATCH TRANSDERMAL ONCE A DAY
Status: ACTIVE | COMMUNITY

## 2023-03-14 RX ORDER — TOBRAMYCIN AND DEXAMETHASONE 3; 1 MG/ML; MG/ML
INSTILL ONE DROP INTO THE RIGHT EYE THREE TIMES DAILY FOR 7 DAYS, THEN SUSPENSION/ DROPS OPHTHALMIC
Qty: 10 | Refills: 0 | Status: ON HOLD | COMMUNITY
Start: 2018-07-23

## 2023-03-14 RX ORDER — ALIROCUMAB 75 MG/ML
INJECT 1 ML EVERY 2 WEEKS INJECTION, SOLUTION SUBCUTANEOUS
Refills: 0 | Status: ACTIVE | COMMUNITY
Start: 2020-01-30

## 2023-03-14 RX ORDER — TICAGRELOR 90 MG/1
TABLET ORAL
Qty: 60 | Refills: 0 | Status: ACTIVE | COMMUNITY
Start: 2018-12-08

## 2023-03-14 RX ORDER — PRAVASTATIN SODIUM 20 MG/1
TAKE 1 TABLET DAILY AS DIRECTED TABLET ORAL
Refills: 0 | Status: ACTIVE | COMMUNITY

## 2023-03-14 NOTE — HPI-TODAY'S VISIT:
Mr. Tovar is a 67-year-old gentleman with a history of coronary artery disease status post PTCA with stent placement (12/18) on Brilinta, obstructive sleep apnea, GERD short segment Isaac's esophagus without dysplasia presenting for follow up.  He was last seen on 8/3/2022 for follow-up regarding GERD and atypical chest pain status post unremarkable EGD with empiric dilation of the esophagus with an 18 mm Savary dilator.  His symptoms had improved with course of antibiotics he took for a root canal.  He was recommended to continue pantoprazole 40 mg daily and use GI cocktail for relief of exacerbations of chest pain.  He returns for follow-up reporting waxing waning issues of indigestion is been worse over the last 3 weeks.  He continues to use GI cocktail with relief of chest pain.  He also continues to take pantoprazole 40 mg every morning before breakfast.  He reports that the episodes of "indigestion" occur 6-7 times per week.  He denies any dysphagia and the episodes do not wake him from sleep.  There is no associated palpitations or shortness of breath.  He reports undergoing a negative cardiac evaluation that included a unremarkable left heart catheterization.  His bowel habits are variable consistency but no mucus or blood in his stool.  Denies any abdominal pain, fevers or weight loss.

## 2023-03-14 NOTE — HPI-OTHER HISTORIES
EGD (6/8/2022): unexplained chest pain and upper abdominal pain.  The findings are notable for no endoscopic esophageal abnormality to explain the dysphagia status post empiric dilation of the esophagus with an 18 mm savory dilator, irregular squamocolumnar junction without signs of Isaac's esophagus, gastric erythema status post biopsy showing chemical/reactive gastropathy without H. pylori, and a normal duodenum.  EGD with dilation (7/29/2020): Z-line irregular otherwise normal esophagus.  No endoscopic esophageal abnormality to explain patient's dysphagia.  Nausea gastropathy.  Normal duodenum.  Stomach antrum and body biopsies revealed mild chemical/reactive gastropathy.  No evidence for dysplasia or malignancy or infectious organisms.  GE junction biopsies revealed gastric type mucosa with reflux type changes; no squamous mucosa identified.  No evidence of Isaac's esophagus or eosinophilic esophagitis.  Negative for infectious organisms, dysplasia or malignancy. Colonoscopy (8/25/2020): [place] bowel preparation scale score of 9.  4 (4 to 5 mm) polyps in the transverse colon and at the hepatic flexure.  Diverticulosis in the ascending colon.  Nonbleeding internal hemorrhoids, grade 1.  Pathology revealed these to be tubular adenomas.  CT abdomen and pelvis with contrast (6/11/19) : no acute findings, 9 mm left renal calculus. EGD (11/28/18) : irregular Z-line with biopsies showing focal intestinal metaplasia but not dysplasia, a small sliding hiatal hernia, nonerosive gastropathy in the antrum with biopsies negative for significant pathology, and a normal duodenal bulb and second portion of the duodenum. Abdominal ultrasound (8/30/18) : 1.3cm nonobstructing shadowing stone within left kidney, increased hepatic echogenicity, statistically hepatic steatosis.

## 2023-05-17 ENCOUNTER — OFFICE VISIT (OUTPATIENT)
Dept: URBAN - METROPOLITAN AREA CLINIC 107 | Facility: CLINIC | Age: 68
End: 2023-05-17
Payer: MEDICARE

## 2023-05-17 VITALS
RESPIRATION RATE: 18 BRPM | HEART RATE: 63 BPM | TEMPERATURE: 98.1 F | WEIGHT: 245 LBS | HEIGHT: 73 IN | BODY MASS INDEX: 32.47 KG/M2 | DIASTOLIC BLOOD PRESSURE: 73 MMHG | SYSTOLIC BLOOD PRESSURE: 134 MMHG

## 2023-05-17 DIAGNOSIS — K21.9 GERD WITHOUT ESOPHAGITIS: ICD-10-CM

## 2023-05-17 DIAGNOSIS — R07.89 ATYPICAL CHEST PAIN: ICD-10-CM

## 2023-05-17 DIAGNOSIS — K76.0 FATTY LIVER: ICD-10-CM

## 2023-05-17 DIAGNOSIS — Z86.010 HISTORY OF ADENOMATOUS POLYP OF COLON: ICD-10-CM

## 2023-05-17 PROCEDURE — 99213 OFFICE O/P EST LOW 20 MIN: CPT | Performed by: INTERNAL MEDICINE

## 2023-05-17 RX ORDER — PANTOPRAZOLE SODIUM 40 MG/1
1 TABLET 30 MINUTES BEFORE A MEAL TABLET, DELAYED RELEASE ORAL ONCE A DAY
OUTPATIENT
Start: 2020-06-02

## 2023-05-17 RX ORDER — NITROGLYCERIN 0.2 MG/H
1 PATCH TO SKIN REMOVE AFTER 12 HOURS PATCH TRANSDERMAL ONCE A DAY
Status: ACTIVE | COMMUNITY

## 2023-05-17 RX ORDER — TOBRAMYCIN AND DEXAMETHASONE 3; 1 MG/ML; MG/ML
INSTILL ONE DROP INTO THE RIGHT EYE THREE TIMES DAILY FOR 7 DAYS, THEN SUSPENSION/ DROPS OPHTHALMIC
Qty: 10 | Refills: 0 | Status: ON HOLD | COMMUNITY
Start: 2018-07-23

## 2023-05-17 RX ORDER — TICAGRELOR 90 MG/1
TABLET ORAL
Qty: 60 | Refills: 0 | Status: ACTIVE | COMMUNITY
Start: 2018-12-08

## 2023-05-17 RX ORDER — ALIROCUMAB 75 MG/ML
INJECT 1 ML EVERY 2 WEEKS INJECTION, SOLUTION SUBCUTANEOUS
Refills: 0 | Status: ACTIVE | COMMUNITY
Start: 2020-01-30

## 2023-05-17 RX ORDER — PRAVASTATIN SODIUM 20 MG/1
TAKE 1 TABLET DAILY AS DIRECTED TABLET ORAL
Refills: 0 | Status: ACTIVE | COMMUNITY

## 2023-05-17 RX ORDER — PANTOPRAZOLE SODIUM 40 MG/1
1 TABLET 30 MINUTES BEFORE A MEAL TABLET, DELAYED RELEASE ORAL ONCE A DAY
Status: ACTIVE | COMMUNITY
Start: 2020-06-02

## 2023-05-17 RX ORDER — LISINOPRIL 10 MG/1
1 TABLET TABLET ORAL ONCE A DAY
Status: ACTIVE | COMMUNITY

## 2023-05-17 RX ORDER — CARVEDILOL 3.12 MG/1
1 TABLET WITH FOOD TABLET, FILM COATED ORAL TWICE A DAY
Status: ACTIVE | COMMUNITY

## 2023-05-17 NOTE — HPI-TODAY'S VISIT:
Mr. Tovar is a 68-year-old gentleman with a history of coronary artery disease status post PTCA with stent placement (12/18) on Brilinta, obstructive sleep apnea, GERD short segment Isaac's esophagus without dysplasia presenting for follow up.  He reports that he is overall doing well with no significant heartburn taking pantoprazole 40 mg daily.  He has occasional episodes of chest pain is relieved with a GI cocktail.  He uses a GI cocktail a few times per month.  He denies any dysphagia.  His bowel habits are fairly regular and normal consistency.  He had an abdominal ultrasound on 5/1/2023 that showed changes of hepatic steatosis.  He infrequently consumes alcohol.  Labs on 2/20/2023 showed normal CBC, normal basic metabolic panel and LFTs on 10/26/2022 are normal.

## 2023-05-17 NOTE — HPI-OTHER HISTORIES
EGD (6/8/2022): unexplained chest pain and upper abdominal pain.  The findings are notable for no endoscopic esophageal abnormality to explain the dysphagia status post empiric dilation of the esophagus with an 18 mm savory dilator, irregular squamocolumnar junction without signs of Isaac's esophagus, gastric erythema status post biopsy showing chemical/reactive gastropathy without H. pylori, and a normal duodenum.  EGD with dilation (7/29/2020): Z-line irregular otherwise normal esophagus.  No endoscopic esophageal abnormality to explain patient's dysphagia.  Nausea gastropathy.  Normal duodenum.  Stomach antrum and body biopsies revealed mild chemical/reactive gastropathy.  No evidence for dysplasia or malignancy or infectious organisms.  GE junction biopsies revealed gastric type mucosa with reflux type changes; no squamous mucosa identified.  No evidence of Isaac's esophagus or eosinophilic esophagitis.  Negative for infectious organisms, dysplasia or malignancy. Colonoscopy (8/25/2020): [place] bowel preparation scale score of 9.  4 (4 to 5 mm) polyps in the transverse colon and at the hepatic flexure.  Diverticulosis in the ascending colon.  Nonbleeding internal hemorrhoids, grade 1.  Pathology revealed these to be tubular adenomas.  CT abdomen and pelvis with contrast (6/11/19) : no acute findings, 9 mm left renal calculus. EGD (11/28/18) : irregular Z-line with biopsies showing focal intestinal metaplasia but not dysplasia, a small sliding hiatal hernia, nonerosive gastropathy in the antrum with biopsies negative for significant pathology, and a normal duodenal bulb and second portion of the duodenum. Abdominal ultrasound (8/30/18) : 1.3cm nonobstructing shadowing stone within left kidney, increased hepatic echogenicity, statistically hepatic steatosis.

## 2023-06-01 PROBLEM — 197321007: Status: ACTIVE | Noted: 2023-06-01

## 2023-06-01 PROBLEM — 429047008: Status: ACTIVE | Noted: 2023-06-01

## 2023-09-27 ENCOUNTER — OFFICE VISIT (OUTPATIENT)
Dept: URBAN - METROPOLITAN AREA CLINIC 113 | Facility: CLINIC | Age: 68
End: 2023-09-27
Payer: MEDICARE

## 2023-09-27 VITALS
WEIGHT: 246 LBS | HEART RATE: 81 BPM | TEMPERATURE: 97.7 F | HEIGHT: 73 IN | DIASTOLIC BLOOD PRESSURE: 77 MMHG | SYSTOLIC BLOOD PRESSURE: 121 MMHG | RESPIRATION RATE: 16 BRPM | BODY MASS INDEX: 32.6 KG/M2

## 2023-09-27 DIAGNOSIS — R13.19 ESOPHAGEAL DYSPHAGIA: ICD-10-CM

## 2023-09-27 DIAGNOSIS — Z86.010 HISTORY OF ADENOMATOUS POLYP OF COLON: ICD-10-CM

## 2023-09-27 DIAGNOSIS — K21.9 GERD WITHOUT ESOPHAGITIS: ICD-10-CM

## 2023-09-27 DIAGNOSIS — R07.89 ATYPICAL CHEST PAIN: ICD-10-CM

## 2023-09-27 PROCEDURE — 99214 OFFICE O/P EST MOD 30 MIN: CPT | Performed by: INTERNAL MEDICINE

## 2023-09-27 RX ORDER — POLYETHYLENE GLYCOL 3350, SODIUM SULFATE ANHYDROUS, SODIUM BICARBONATE, SODIUM CHLORIDE, POTASSIUM CHLORIDE 236; 22.74; 6.74; 5.86; 2.97 G/4L; G/4L; G/4L; G/4L; G/4L
AS DIRECTED POWDER, FOR SOLUTION ORAL ONCE
Qty: 1 | Refills: 0 | OUTPATIENT
Start: 2023-10-02 | End: 2023-10-03

## 2023-09-27 RX ORDER — TOBRAMYCIN AND DEXAMETHASONE 3; 1 MG/ML; MG/ML
INSTILL ONE DROP INTO THE RIGHT EYE THREE TIMES DAILY FOR 7 DAYS, THEN SUSPENSION/ DROPS OPHTHALMIC
Qty: 10 | Refills: 0 | Status: ON HOLD | COMMUNITY
Start: 2018-07-23

## 2023-09-27 RX ORDER — TICAGRELOR 90 MG/1
TABLET ORAL
Qty: 60 | Refills: 0 | Status: ACTIVE | COMMUNITY
Start: 2018-12-08

## 2023-09-27 RX ORDER — NITROGLYCERIN 0.2 MG/H
1 PATCH TO SKIN REMOVE AFTER 12 HOURS PATCH TRANSDERMAL ONCE A DAY
Status: ACTIVE | COMMUNITY

## 2023-09-27 RX ORDER — ALIROCUMAB 75 MG/ML
INJECT 1 ML EVERY 2 WEEKS INJECTION, SOLUTION SUBCUTANEOUS
Refills: 0 | Status: ACTIVE | COMMUNITY
Start: 2020-01-30

## 2023-09-27 RX ORDER — PANTOPRAZOLE SODIUM 40 MG/1
1 TABLET 30 MINUTES BEFORE A MEAL TABLET, DELAYED RELEASE ORAL ONCE A DAY
Status: ACTIVE | COMMUNITY
Start: 2020-06-02

## 2023-09-27 RX ORDER — LISINOPRIL 10 MG/1
1 TABLET TABLET ORAL ONCE A DAY
Status: ACTIVE | COMMUNITY

## 2023-09-27 RX ORDER — CARVEDILOL 3.12 MG/1
1 TABLET WITH FOOD TABLET, FILM COATED ORAL TWICE A DAY
Status: ACTIVE | COMMUNITY

## 2023-09-27 RX ORDER — FAMOTIDINE 40 MG/1
1 TABLET AT BEDTIME TABLET, FILM COATED ORAL ONCE A DAY
Qty: 30 | Refills: 5 | OUTPATIENT
Start: 2023-10-02

## 2023-09-27 RX ORDER — PRAVASTATIN SODIUM 20 MG/1
TAKE 1 TABLET DAILY AS DIRECTED TABLET ORAL
Refills: 0 | Status: ACTIVE | COMMUNITY

## 2023-09-27 RX ORDER — PANTOPRAZOLE SODIUM 40 MG/1
1 TABLET 30 MINUTES BEFORE A MEAL TABLET, DELAYED RELEASE ORAL ONCE A DAY
OUTPATIENT
Start: 2020-06-02

## 2023-09-27 NOTE — HPI-TODAY'S VISIT:
Mr. Tovar is a 68-year-old gentleman with a history of coronary artery disease status post PTCA with stent placement (12/18) on Brilinta, obstructive sleep apnea, GERD short segment Isaac's esophagus without dysplasia presenting for follow up.  He was last seen on 5/17/2023 for follow-up regarding GERD and noncardiac chest pain is fairly well controlled on pantoprazole 40 mg daily and using GI cocktail as needed for relief of exacerbations of abdominal pain.  He is also due for colon adenoma surveillance this year.  He was noted to have fatty liver on abdominal ultrasound with normal liver function tests and no stigmata of advanced liver disease.  He was recommended to continue efforts to achieve an ideal body weight with diet modifications and exercise.  He returns for follow-up reporting over the past few months having increased number of episodes of atypical chest pain in more difficulty swallowing solid more than liquid food that he localizes to his mid to lower chest.  He also has a globus sensation.  He has been taking pantoprazole 40 mg daily before his first meal the day.  He is used GI cocktail, but this has not provided relief as in the past.  He is started to use Pepcid twice daily with improvement in symptoms.  He denies any change in bowel habits, melena or red blood per rectum.

## 2023-09-27 NOTE — HPI-OTHER HISTORIES
CAD/Dr. Yanez: Cardiac catheterization (2/2023): coronary arteries patent, LAD stent ok; Catheterization 2020 showed widely patent coronaries; 2019 stent to the LAD;  2018 stent to the RCA; 10/2021 he had been having recurrent sharp chest pain.  Nuclear stress test  Normal.   April 2022 atypical chest pain.  Normal nuclear stress test again.   Abdominal ultrasound (5/1/2023): hepatic steatosis.    EGD (6/8/2022): unexplained chest pain and upper abdominal pain.  The findings are notable for no endoscopic esophageal abnormality to explain the dysphagia status post empiric dilation of the esophagus with an 18 mm savory dilator, irregular squamocolumnar junction without signs of Isaac's esophagus, gastric erythema status post biopsy showing chemical/reactive gastropathy without H. pylori, and a normal duodenum.  EGD with dilation (7/29/2020): Z-line irregular otherwise normal esophagus.  No endoscopic esophageal abnormality to explain patient's dysphagia.  Nausea gastropathy.  Normal duodenum.  Stomach antrum and body biopsies revealed mild chemical/reactive gastropathy.  No evidence for dysplasia or malignancy or infectious organisms.  GE junction biopsies revealed gastric type mucosa with reflux type changes; no squamous mucosa identified.  No evidence of Isaac's esophagus or eosinophilic esophagitis.  Negative for infectious organisms, dysplasia or malignancy. Colonoscopy (8/25/2020): [place] bowel preparation scale score of 9.  4 (4 to 5 mm) polyps in the transverse colon and at the hepatic flexure.  Diverticulosis in the ascending colon.  Nonbleeding internal hemorrhoids, grade 1.  Pathology revealed these to be tubular adenomas.  CT abdomen and pelvis with contrast (6/11/19) : no acute findings, 9 mm left renal calculus. EGD (11/28/18) : irregular Z-line with biopsies showing focal intestinal metaplasia but not dysplasia, a small sliding hiatal hernia, nonerosive gastropathy in the antrum with biopsies negative for significant pathology, and a normal duodenal bulb and second portion of the duodenum. Abdominal ultrasound (8/30/18) : 1.3cm nonobstructing shadowing stone within left kidney, increased hepatic echogenicity, statistically hepatic steatosis.

## 2023-09-29 ENCOUNTER — TELEPHONE ENCOUNTER (OUTPATIENT)
Dept: URBAN - METROPOLITAN AREA CLINIC 113 | Facility: CLINIC | Age: 68
End: 2023-09-29

## 2023-09-29 RX ORDER — FAMOTIDINE 40 MG/1
1 TABLET AT BEDTIME TABLET, FILM COATED ORAL ONCE A DAY
Qty: 30 | OUTPATIENT
Start: 2023-09-29

## 2023-10-02 PROBLEM — 40890009: Status: ACTIVE | Noted: 2023-10-02

## 2023-10-24 ENCOUNTER — CLAIMS CREATED FROM THE CLAIM WINDOW (OUTPATIENT)
Dept: URBAN - METROPOLITAN AREA CLINIC 4 | Facility: CLINIC | Age: 68
End: 2023-10-24
Payer: MEDICARE

## 2023-10-24 ENCOUNTER — OFFICE VISIT (OUTPATIENT)
Dept: URBAN - METROPOLITAN AREA SURGERY CENTER 25 | Facility: SURGERY CENTER | Age: 68
End: 2023-10-24

## 2023-10-24 ENCOUNTER — OUT OF OFFICE VISIT (OUTPATIENT)
Dept: URBAN - METROPOLITAN AREA SURGERY CENTER 25 | Facility: SURGERY CENTER | Age: 68
End: 2023-10-24
Payer: MEDICARE

## 2023-10-24 DIAGNOSIS — R13.14 CRICOPHARYNGEAL DISORDER: ICD-10-CM

## 2023-10-24 DIAGNOSIS — K21.9 GASTRO-ESOPHAGEAL REFLUX DISEASE WITHOUT ESOPHAGITIS: ICD-10-CM

## 2023-10-24 DIAGNOSIS — R13.10 DYSPHAGIA: ICD-10-CM

## 2023-10-24 DIAGNOSIS — K31.89 ACHYLIA: ICD-10-CM

## 2023-10-24 DIAGNOSIS — K22.9 IRREGULAR Z LINE OF ESOPHAGUS: ICD-10-CM

## 2023-10-24 DIAGNOSIS — T47.8X5A ADVERSE EFFECT OF OTHER AGENTS PRIMARILY AFFECTING GASTROINTESTINAL SYSTEM, INITIAL ENCOUNTER: ICD-10-CM

## 2023-10-24 DIAGNOSIS — K21.9 ACID REFLUX: ICD-10-CM

## 2023-10-24 DIAGNOSIS — K31.89 OTHER DISEASES OF STOMACH AND DUODENUM: ICD-10-CM

## 2023-10-24 PROCEDURE — 43248 EGD GUIDE WIRE INSERTION: CPT | Performed by: CLINIC/CENTER

## 2023-10-24 PROCEDURE — 43248 EGD GUIDE WIRE INSERTION: CPT | Performed by: INTERNAL MEDICINE

## 2023-10-24 PROCEDURE — 00731 ANES UPR GI NDSC PX NOS: CPT | Performed by: ANESTHESIOLOGIST ASSISTANT

## 2023-10-24 PROCEDURE — 43239 EGD BIOPSY SINGLE/MULTIPLE: CPT | Performed by: CLINIC/CENTER

## 2023-10-24 PROCEDURE — 88312 SPECIAL STAINS GROUP 1: CPT | Performed by: PATHOLOGY

## 2023-10-24 PROCEDURE — 43239 EGD BIOPSY SINGLE/MULTIPLE: CPT | Performed by: INTERNAL MEDICINE

## 2023-10-24 PROCEDURE — G8907 PT DOC NO EVENTS ON DISCHARG: HCPCS | Performed by: CLINIC/CENTER

## 2023-10-24 PROCEDURE — 00731 ANES UPR GI NDSC PX NOS: CPT | Performed by: ANESTHESIOLOGY

## 2023-10-24 PROCEDURE — 88305 TISSUE EXAM BY PATHOLOGIST: CPT | Performed by: PATHOLOGY

## 2023-10-24 RX ORDER — FAMOTIDINE 40 MG/1
1 TABLET AT BEDTIME TABLET, FILM COATED ORAL ONCE A DAY
Qty: 30 | Refills: 5 | Status: ACTIVE | COMMUNITY
Start: 2023-10-02

## 2023-10-24 RX ORDER — TOBRAMYCIN AND DEXAMETHASONE 3; 1 MG/ML; MG/ML
INSTILL ONE DROP INTO THE RIGHT EYE THREE TIMES DAILY FOR 7 DAYS, THEN SUSPENSION/ DROPS OPHTHALMIC
Qty: 10 | Refills: 0 | Status: ON HOLD | COMMUNITY
Start: 2018-07-23

## 2023-10-24 RX ORDER — LISINOPRIL 10 MG/1
1 TABLET TABLET ORAL ONCE A DAY
Status: ACTIVE | COMMUNITY

## 2023-10-24 RX ORDER — TICAGRELOR 90 MG/1
TABLET ORAL
Qty: 60 | Refills: 0 | Status: ACTIVE | COMMUNITY
Start: 2018-12-08

## 2023-10-24 RX ORDER — PRAVASTATIN SODIUM 20 MG/1
TAKE 1 TABLET DAILY AS DIRECTED TABLET ORAL
Refills: 0 | Status: ACTIVE | COMMUNITY

## 2023-10-24 RX ORDER — FAMOTIDINE 40 MG/1
1 TABLET AT BEDTIME TABLET, FILM COATED ORAL ONCE A DAY
Qty: 30 | Status: ACTIVE | COMMUNITY
Start: 2023-09-29

## 2023-10-24 RX ORDER — ALIROCUMAB 75 MG/ML
INJECT 1 ML EVERY 2 WEEKS INJECTION, SOLUTION SUBCUTANEOUS
Refills: 0 | Status: ACTIVE | COMMUNITY
Start: 2020-01-30

## 2023-10-24 RX ORDER — NITROGLYCERIN 0.2 MG/H
1 PATCH TO SKIN REMOVE AFTER 12 HOURS PATCH TRANSDERMAL ONCE A DAY
Status: ACTIVE | COMMUNITY

## 2023-10-24 RX ORDER — CARVEDILOL 3.12 MG/1
1 TABLET WITH FOOD TABLET, FILM COATED ORAL TWICE A DAY
Status: ACTIVE | COMMUNITY

## 2023-10-24 RX ORDER — PANTOPRAZOLE SODIUM 40 MG/1
1 TABLET 30 MINUTES BEFORE A MEAL TABLET, DELAYED RELEASE ORAL ONCE A DAY
Status: ACTIVE | COMMUNITY
Start: 2020-06-02

## 2024-01-04 ENCOUNTER — LAB OUTSIDE AN ENCOUNTER (OUTPATIENT)
Dept: URBAN - METROPOLITAN AREA CLINIC 113 | Facility: CLINIC | Age: 69
End: 2024-01-04

## 2024-01-04 ENCOUNTER — OFFICE VISIT (OUTPATIENT)
Dept: URBAN - METROPOLITAN AREA CLINIC 113 | Facility: CLINIC | Age: 69
End: 2024-01-04
Payer: MEDICARE

## 2024-01-04 VITALS
DIASTOLIC BLOOD PRESSURE: 75 MMHG | HEIGHT: 73 IN | BODY MASS INDEX: 33.27 KG/M2 | HEART RATE: 74 BPM | WEIGHT: 251 LBS | SYSTOLIC BLOOD PRESSURE: 135 MMHG | TEMPERATURE: 97.7 F | RESPIRATION RATE: 16 BRPM

## 2024-01-04 DIAGNOSIS — K21.9 GERD WITHOUT ESOPHAGITIS: ICD-10-CM

## 2024-01-04 DIAGNOSIS — R13.19 ESOPHAGEAL DYSPHAGIA: ICD-10-CM

## 2024-01-04 DIAGNOSIS — R07.89 ATYPICAL CHEST PAIN: ICD-10-CM

## 2024-01-04 DIAGNOSIS — Z86.010 HISTORY OF ADENOMATOUS POLYP OF COLON: ICD-10-CM

## 2024-01-04 PROCEDURE — 99213 OFFICE O/P EST LOW 20 MIN: CPT | Performed by: NURSE PRACTITIONER

## 2024-01-04 RX ORDER — PRAVASTATIN SODIUM 20 MG/1
TAKE 1 TABLET DAILY AS DIRECTED TABLET ORAL
Refills: 0 | Status: ACTIVE | COMMUNITY

## 2024-01-04 RX ORDER — CARVEDILOL 3.12 MG/1
1 TABLET WITH FOOD TABLET, FILM COATED ORAL TWICE A DAY
Status: ACTIVE | COMMUNITY

## 2024-01-04 RX ORDER — TICAGRELOR 90 MG/1
TABLET ORAL
Qty: 60 | Refills: 0 | Status: ACTIVE | COMMUNITY
Start: 2018-12-08

## 2024-01-04 RX ORDER — AMLODIPINE BESYLATE 5 MG/1
1 TABLET TABLET ORAL ONCE A DAY
Status: ACTIVE | COMMUNITY

## 2024-01-04 RX ORDER — FAMOTIDINE 40 MG/1
1 TABLET AT BEDTIME TABLET, FILM COATED ORAL ONCE A DAY
Qty: 30 | Refills: 5 | Status: ACTIVE | COMMUNITY
Start: 2023-10-02

## 2024-01-04 RX ORDER — TOBRAMYCIN AND DEXAMETHASONE 3; 1 MG/ML; MG/ML
INSTILL ONE DROP INTO THE RIGHT EYE THREE TIMES DAILY FOR 7 DAYS, THEN SUSPENSION/ DROPS OPHTHALMIC
Qty: 10 | Refills: 0 | Status: ON HOLD | COMMUNITY
Start: 2018-07-23

## 2024-01-04 RX ORDER — LISINOPRIL 10 MG/1
1 TABLET TABLET ORAL ONCE A DAY
Status: ON HOLD | COMMUNITY

## 2024-01-04 RX ORDER — SODIUM, POTASSIUM,MAG SULFATES 17.5-3.13G
177ML AS DIRECTED SOLUTION, RECONSTITUTED, ORAL ORAL
Qty: 1 KIT | Refills: 0 | OUTPATIENT
Start: 2024-01-04 | End: 2024-01-06

## 2024-01-04 RX ORDER — PANTOPRAZOLE SODIUM 40 MG/1
1 TABLET 30 MINUTES BEFORE A MEAL TABLET, DELAYED RELEASE ORAL ONCE A DAY
Status: ACTIVE | COMMUNITY
Start: 2020-06-02

## 2024-01-04 RX ORDER — ALIROCUMAB 75 MG/ML
INJECT 1 ML EVERY 2 WEEKS INJECTION, SOLUTION SUBCUTANEOUS
Refills: 0 | Status: ACTIVE | COMMUNITY
Start: 2020-01-30

## 2024-01-04 RX ORDER — NITROGLYCERIN 0.2 MG/H
1 PATCH TO SKIN REMOVE AFTER 12 HOURS PATCH TRANSDERMAL ONCE A DAY
Status: ACTIVE | COMMUNITY

## 2024-01-04 RX ORDER — FAMOTIDINE 40 MG/1
1 TABLET AT BEDTIME TABLET, FILM COATED ORAL ONCE A DAY
Qty: 30 | Status: ACTIVE | COMMUNITY
Start: 2023-09-29

## 2024-01-04 NOTE — HPI-TODAY'S VISIT:
Mr. Tovar is a 68-year-old gentleman with a history of coronary artery disease status post PTCA with stent placement (12/18) on Brilinta, obstructive sleep apnea, GERD short segment Isaac's esophagus without dysplasia presenting for follow up. He was last seen 9/7/2023 for follow-up regarding GERD and atypical chest pain with worsening esophageal dysphagia.  He was to continue pantoprazole 40 mg each morning with the addition of famotidine 40 mg prior to bedtime and an EGD with esophageal dilation was planned.  Esophageal manometry was considered.  A colonoscopy was also recommended for polyp surveillance. The EGD was performed on 10/20/2023.  There was no endoscopic esophageal abnormality to explain his dysphagia status post dilation of the entire esophagus up to 18 mm with Savary dilator.  Also notable for nonerosive gastropathy.  Gastric biopsy showed PPI effect, negative for H. pylori. Fortunately, his dysphagia has resolved following the dilation.  He recently underwent a routine cardiac catheterization with cardiology. He states his stents are patent and no additional intervention was required.  Since the time of the cardiac cath, he has not experienced further chest or abdominal pain.  He is ready to schedule his colonoscopy.

## 2024-01-04 NOTE — HPI-OTHER HISTORIES
CAD/Dr. Yanez: Cardiac catheterization (2/2023): coronary arteries patent, LAD stent ok; Catheterization 2020 showed widely patent coronaries; 2019 stent to the LAD;  2018 stent to the RCA; 10/2021 he had been having recurrent sharp chest pain.  Nuclear stress test  Normal.   April 2022 atypical chest pain.  Normal nuclear stress test again.   Abdominal ultrasound (5/1/2023): hepatic steatosis.    EGD (6/8/2022): unexplained chest pain and upper abdominal pain.  The findings are notable for no endoscopic esophageal abnormality to explain the dysphagia status post empiric dilation of the esophagus with an 18 mm savory dilator, irregular squamocolumnar junction without signs of Isaac's esophagus, gastric erythema status post biopsy showing chemical/reactive gastropathy without H. pylori, and a normal duodenum.  EGD with dilation (7/29/2020): Z-line irregular otherwise normal esophagus.  No endoscopic esophageal abnormality to explain patient's dysphagia.  Nausea gastropathy.  Normal duodenum.  Stomach antrum and body biopsies revealed mild chemical/reactive gastropathy.  No evidence for dysplasia or malignancy or infectious organisms.  GE junction biopsies revealed gastric type mucosa with reflux type changes; no squamous mucosa identified.  No evidence of Isaac's esophagus or eosinophilic esophagitis.  Negative for infectious organisms, dysplasia or malignancy. Colonoscopy (8/25/2020): [place] bowel preparation scale score of 9.  4 (4 to 5 mm) polyps in the transverse colon and at the hepatic flexure.  Diverticulosis in the ascending colon.  Nonbleeding internal hemorrhoids, grade 1.  Pathology revealed these to be tubular adenomas.  CT abdomen and pelvis with contrast (6/11/19) : no acute findings, 9 mm left renal calculus. EGD (11/28/18) : irregular Z-line with biopsies showing focal intestinal metaplasia but not dysplasia, a small sliding hiatal hernia, nonerosive gastropathy in the antrum with biopsies negative for significant pathology, and a normal duodenal bulb and second portion of the duodenum. Abdominal ultrasound (8/30/18) : 1.3cm nonobstructing shadowing stone within left kidney, increased hepatic echogenicity, statistically hepatic steatosis.

## 2024-01-25 ENCOUNTER — OFFICE VISIT (OUTPATIENT)
Dept: URBAN - METROPOLITAN AREA SURGERY CENTER 25 | Facility: SURGERY CENTER | Age: 69
End: 2024-01-25

## 2024-02-15 ENCOUNTER — OV EP (OUTPATIENT)
Dept: URBAN - METROPOLITAN AREA CLINIC 113 | Facility: CLINIC | Age: 69
End: 2024-02-15

## 2024-02-15 RX ORDER — FAMOTIDINE 40 MG/1
1 TABLET AT BEDTIME TABLET, FILM COATED ORAL ONCE A DAY
Qty: 30 | Status: ACTIVE | COMMUNITY
Start: 2023-09-29

## 2024-02-15 RX ORDER — LISINOPRIL 10 MG/1
1 TABLET TABLET ORAL ONCE A DAY
Status: ON HOLD | COMMUNITY

## 2024-02-15 RX ORDER — AMLODIPINE BESYLATE 5 MG/1
1 TABLET TABLET ORAL ONCE A DAY
Status: ACTIVE | COMMUNITY

## 2024-02-15 RX ORDER — PANTOPRAZOLE SODIUM 40 MG/1
1 TABLET 30 MINUTES BEFORE A MEAL TABLET, DELAYED RELEASE ORAL ONCE A DAY
Status: ACTIVE | COMMUNITY
Start: 2020-06-02

## 2024-02-15 RX ORDER — TICAGRELOR 90 MG/1
TABLET ORAL
Qty: 60 | Refills: 0 | Status: ACTIVE | COMMUNITY
Start: 2018-12-08

## 2024-02-15 RX ORDER — NITROGLYCERIN 0.2 MG/H
1 PATCH TO SKIN REMOVE AFTER 12 HOURS PATCH TRANSDERMAL ONCE A DAY
Status: ACTIVE | COMMUNITY

## 2024-02-15 RX ORDER — FAMOTIDINE 40 MG/1
1 TABLET AT BEDTIME TABLET, FILM COATED ORAL ONCE A DAY
Qty: 30 | Refills: 5 | Status: ACTIVE | COMMUNITY
Start: 2023-10-02

## 2024-02-15 RX ORDER — CARVEDILOL 3.12 MG/1
1 TABLET WITH FOOD TABLET, FILM COATED ORAL TWICE A DAY
Status: ACTIVE | COMMUNITY

## 2024-02-15 RX ORDER — ALIROCUMAB 75 MG/ML
INJECT 1 ML EVERY 2 WEEKS INJECTION, SOLUTION SUBCUTANEOUS
Refills: 0 | Status: ACTIVE | COMMUNITY
Start: 2020-01-30

## 2024-02-15 RX ORDER — PRAVASTATIN SODIUM 20 MG/1
TAKE 1 TABLET DAILY AS DIRECTED TABLET ORAL
Refills: 0 | Status: ACTIVE | COMMUNITY

## 2024-02-15 RX ORDER — TOBRAMYCIN AND DEXAMETHASONE 3; 1 MG/ML; MG/ML
INSTILL ONE DROP INTO THE RIGHT EYE THREE TIMES DAILY FOR 7 DAYS, THEN SUSPENSION/ DROPS OPHTHALMIC
Qty: 10 | Refills: 0 | Status: ON HOLD | COMMUNITY
Start: 2018-07-23

## 2024-02-27 ENCOUNTER — OV EP (OUTPATIENT)
Dept: URBAN - METROPOLITAN AREA CLINIC 113 | Facility: CLINIC | Age: 69
End: 2024-02-27

## 2024-02-27 RX ORDER — NITROGLYCERIN 0.2 MG/H
1 PATCH TO SKIN REMOVE AFTER 12 HOURS PATCH TRANSDERMAL ONCE A DAY
Status: ACTIVE | COMMUNITY

## 2024-02-27 RX ORDER — CARVEDILOL 3.12 MG/1
1 TABLET WITH FOOD TABLET, FILM COATED ORAL TWICE A DAY
Status: ACTIVE | COMMUNITY

## 2024-02-27 RX ORDER — ALIROCUMAB 75 MG/ML
INJECT 1 ML EVERY 2 WEEKS INJECTION, SOLUTION SUBCUTANEOUS
Refills: 0 | Status: ACTIVE | COMMUNITY
Start: 2020-01-30

## 2024-02-27 RX ORDER — TICAGRELOR 90 MG/1
TABLET ORAL
Qty: 60 | Refills: 0 | Status: ACTIVE | COMMUNITY
Start: 2018-12-08

## 2024-02-27 RX ORDER — LISINOPRIL 10 MG/1
1 TABLET TABLET ORAL ONCE A DAY
Status: ON HOLD | COMMUNITY

## 2024-02-27 RX ORDER — FAMOTIDINE 40 MG/1
1 TABLET AT BEDTIME TABLET, FILM COATED ORAL ONCE A DAY
Qty: 30 | Refills: 5 | Status: ACTIVE | COMMUNITY
Start: 2023-10-02

## 2024-02-27 RX ORDER — PRAVASTATIN SODIUM 20 MG/1
TAKE 1 TABLET DAILY AS DIRECTED TABLET ORAL
Refills: 0 | Status: ACTIVE | COMMUNITY

## 2024-02-27 RX ORDER — PANTOPRAZOLE SODIUM 40 MG/1
1 TABLET 30 MINUTES BEFORE A MEAL TABLET, DELAYED RELEASE ORAL ONCE A DAY
Status: ACTIVE | COMMUNITY
Start: 2020-06-02

## 2024-02-27 RX ORDER — TOBRAMYCIN AND DEXAMETHASONE 3; 1 MG/ML; MG/ML
INSTILL ONE DROP INTO THE RIGHT EYE THREE TIMES DAILY FOR 7 DAYS, THEN SUSPENSION/ DROPS OPHTHALMIC
Qty: 10 | Refills: 0 | Status: ON HOLD | COMMUNITY
Start: 2018-07-23

## 2024-02-27 RX ORDER — FAMOTIDINE 40 MG/1
1 TABLET AT BEDTIME TABLET, FILM COATED ORAL ONCE A DAY
Qty: 30 | Status: ACTIVE | COMMUNITY
Start: 2023-09-29

## 2024-02-27 RX ORDER — AMLODIPINE BESYLATE 5 MG/1
1 TABLET TABLET ORAL ONCE A DAY
Status: ACTIVE | COMMUNITY

## 2024-03-07 ENCOUNTER — COLON (OUTPATIENT)
Dept: URBAN - METROPOLITAN AREA SURGERY CENTER 25 | Facility: SURGERY CENTER | Age: 69
End: 2024-03-07
Payer: MEDICARE

## 2024-03-07 ENCOUNTER — LAB (OUTPATIENT)
Dept: URBAN - METROPOLITAN AREA CLINIC 4 | Facility: CLINIC | Age: 69
End: 2024-03-07
Payer: MEDICARE

## 2024-03-07 DIAGNOSIS — D12.3 ADENOMA OF TRANSVERSE COLON: ICD-10-CM

## 2024-03-07 DIAGNOSIS — Z86.010 ADENOMAS PERSONAL HISTORY OF COLONIC POLYPS: ICD-10-CM

## 2024-03-07 DIAGNOSIS — Z09 CARDIOLOGY FOLLOW-UP ENCOUNTER: ICD-10-CM

## 2024-03-07 DIAGNOSIS — D12.5 ADENOMA OF SIGMOID COLON: ICD-10-CM

## 2024-03-07 DIAGNOSIS — D12.5 BENIGN NEOPLASM OF SIGMOID COLON: ICD-10-CM

## 2024-03-07 PROCEDURE — 45385 COLONOSCOPY W/LESION REMOVAL: CPT | Performed by: INTERNAL MEDICINE

## 2024-03-07 PROCEDURE — 88305 TISSUE EXAM BY PATHOLOGIST: CPT | Performed by: PATHOLOGY

## 2024-03-07 RX ORDER — CARVEDILOL 3.12 MG/1
1 TABLET WITH FOOD TABLET, FILM COATED ORAL TWICE A DAY
Status: ACTIVE | COMMUNITY

## 2024-03-07 RX ORDER — TOBRAMYCIN AND DEXAMETHASONE 3; 1 MG/ML; MG/ML
INSTILL ONE DROP INTO THE RIGHT EYE THREE TIMES DAILY FOR 7 DAYS, THEN SUSPENSION/ DROPS OPHTHALMIC
Qty: 10 | Refills: 0 | Status: ON HOLD | COMMUNITY
Start: 2018-07-23

## 2024-03-07 RX ORDER — FAMOTIDINE 40 MG/1
1 TABLET AT BEDTIME TABLET, FILM COATED ORAL ONCE A DAY
Qty: 30 | Status: ACTIVE | COMMUNITY
Start: 2023-09-29

## 2024-03-07 RX ORDER — FAMOTIDINE 40 MG/1
1 TABLET AT BEDTIME TABLET, FILM COATED ORAL ONCE A DAY
Qty: 30 | Refills: 5 | Status: ACTIVE | COMMUNITY
Start: 2023-10-02

## 2024-03-07 RX ORDER — AMLODIPINE BESYLATE 5 MG/1
1 TABLET TABLET ORAL ONCE A DAY
Status: ACTIVE | COMMUNITY

## 2024-03-07 RX ORDER — LISINOPRIL 10 MG/1
1 TABLET TABLET ORAL ONCE A DAY
Status: ON HOLD | COMMUNITY

## 2024-03-07 RX ORDER — NITROGLYCERIN 0.2 MG/H
1 PATCH TO SKIN REMOVE AFTER 12 HOURS PATCH TRANSDERMAL ONCE A DAY
Status: ACTIVE | COMMUNITY

## 2024-03-07 RX ORDER — PRAVASTATIN SODIUM 20 MG/1
TAKE 1 TABLET DAILY AS DIRECTED TABLET ORAL
Refills: 0 | Status: ACTIVE | COMMUNITY

## 2024-03-07 RX ORDER — TICAGRELOR 90 MG/1
TABLET ORAL
Qty: 60 | Refills: 0 | Status: ACTIVE | COMMUNITY
Start: 2018-12-08

## 2024-03-07 RX ORDER — ALIROCUMAB 75 MG/ML
INJECT 1 ML EVERY 2 WEEKS INJECTION, SOLUTION SUBCUTANEOUS
Refills: 0 | Status: ACTIVE | COMMUNITY
Start: 2020-01-30

## 2024-03-07 RX ORDER — PANTOPRAZOLE SODIUM 40 MG/1
1 TABLET 30 MINUTES BEFORE A MEAL TABLET, DELAYED RELEASE ORAL ONCE A DAY
Status: ACTIVE | COMMUNITY
Start: 2020-06-02

## 2024-04-09 ENCOUNTER — OV EP (OUTPATIENT)
Dept: URBAN - METROPOLITAN AREA CLINIC 113 | Facility: CLINIC | Age: 69
End: 2024-04-09

## 2024-04-09 RX ORDER — PANTOPRAZOLE SODIUM 40 MG/1
1 TABLET 30 MINUTES BEFORE A MEAL TABLET, DELAYED RELEASE ORAL ONCE A DAY
Status: ACTIVE | COMMUNITY
Start: 2020-06-02

## 2024-04-09 RX ORDER — AMLODIPINE BESYLATE 5 MG/1
1 TABLET TABLET ORAL ONCE A DAY
Status: ACTIVE | COMMUNITY

## 2024-04-09 RX ORDER — ALIROCUMAB 75 MG/ML
INJECT 1 ML EVERY 2 WEEKS INJECTION, SOLUTION SUBCUTANEOUS
Refills: 0 | Status: ACTIVE | COMMUNITY
Start: 2020-01-30

## 2024-04-09 RX ORDER — LISINOPRIL 10 MG/1
1 TABLET TABLET ORAL ONCE A DAY
Status: ON HOLD | COMMUNITY

## 2024-04-09 RX ORDER — PRAVASTATIN SODIUM 20 MG/1
TAKE 1 TABLET DAILY AS DIRECTED TABLET ORAL
Refills: 0 | Status: ACTIVE | COMMUNITY

## 2024-04-09 RX ORDER — FAMOTIDINE 40 MG/1
1 TABLET AT BEDTIME TABLET, FILM COATED ORAL ONCE A DAY
Qty: 30 | Refills: 5 | Status: ACTIVE | COMMUNITY
Start: 2023-10-02

## 2024-04-09 RX ORDER — TOBRAMYCIN AND DEXAMETHASONE 3; 1 MG/ML; MG/ML
INSTILL ONE DROP INTO THE RIGHT EYE THREE TIMES DAILY FOR 7 DAYS, THEN SUSPENSION/ DROPS OPHTHALMIC
Qty: 10 | Refills: 0 | Status: ON HOLD | COMMUNITY
Start: 2018-07-23

## 2024-04-09 RX ORDER — FAMOTIDINE 40 MG/1
1 TABLET AT BEDTIME TABLET, FILM COATED ORAL ONCE A DAY
Qty: 30 | Status: ACTIVE | COMMUNITY
Start: 2023-09-29

## 2024-04-09 RX ORDER — CARVEDILOL 3.12 MG/1
1 TABLET WITH FOOD TABLET, FILM COATED ORAL TWICE A DAY
Status: ACTIVE | COMMUNITY

## 2024-04-09 RX ORDER — NITROGLYCERIN 0.2 MG/H
1 PATCH TO SKIN REMOVE AFTER 12 HOURS PATCH TRANSDERMAL ONCE A DAY
Status: ACTIVE | COMMUNITY

## 2024-04-09 RX ORDER — TICAGRELOR 90 MG/1
TABLET ORAL
Qty: 60 | Refills: 0 | Status: ACTIVE | COMMUNITY
Start: 2018-12-08

## 2024-10-28 ENCOUNTER — ERX REFILL RESPONSE (OUTPATIENT)
Dept: URBAN - METROPOLITAN AREA CLINIC 113 | Facility: CLINIC | Age: 69
End: 2024-10-28

## 2024-10-28 RX ORDER — FAMOTIDINE 40 MG/1
TAKE ONE TABLET BY MOUTH AT BEDTIME TABLET ORAL
Qty: 30 TABLET | Refills: 4 | OUTPATIENT

## 2024-10-28 RX ORDER — FAMOTIDINE 40 MG/1
1 TABLET AT BEDTIME TABLET, FILM COATED ORAL ONCE A DAY
Qty: 30 | OUTPATIENT